# Patient Record
Sex: MALE | Race: WHITE | Employment: UNEMPLOYED | URBAN - NONMETROPOLITAN AREA
[De-identification: names, ages, dates, MRNs, and addresses within clinical notes are randomized per-mention and may not be internally consistent; named-entity substitution may affect disease eponyms.]

---

## 2019-04-02 ENCOUNTER — TELEPHONE (OUTPATIENT)
Dept: INTERNAL MEDICINE CLINIC | Age: 39
End: 2019-04-02

## 2019-04-02 NOTE — TELEPHONE ENCOUNTER
Returning phone call back to wife. Wife stated patient has an appt with the diabetic nurse educator on Thursday, April 4th of this week at the [facility] and needs to change the appt. I explained that the DB center does not have an appt scheduled and we do not have a referral for him. Wife stated she must of called the wrong office.

## 2019-09-04 ENCOUNTER — TELEPHONE (OUTPATIENT)
Dept: FAMILY MEDICINE CLINIC | Age: 39
End: 2019-09-04

## 2019-09-04 NOTE — TELEPHONE ENCOUNTER
Left detailed message on mach    1. Appt time and date. (give directions)     2. Arrive 15 min before appt. 3. Please bring all medications to appt. 4. Bring immunization record.   (if no record, which immunizations have you had and where?)      Future Appointments   Date Time Provider Rachel Champagne   9/24/2019 12:40 PM Hans Sandifer, [address] [place] [address]

## 2020-02-24 ENCOUNTER — OFFICE VISIT (OUTPATIENT)
Dept: FAMILY MEDICINE CLINIC | Age: 40
End: 2020-02-24
Payer: COMMERCIAL

## 2020-02-24 VITALS
TEMPERATURE: 98.8 F | BODY MASS INDEX: 60.16 KG/M2 | RESPIRATION RATE: 20 BRPM | OXYGEN SATURATION: 99 % | SYSTOLIC BLOOD PRESSURE: 138 MMHG | DIASTOLIC BLOOD PRESSURE: 88 MMHG | HEART RATE: 90 BPM | HEIGHT: 73 IN

## 2020-02-24 PROBLEM — E03.9 HYPOTHYROIDISM: Status: ACTIVE | Noted: 2020-02-24

## 2020-02-24 PROBLEM — E11.9 DIABETES MELLITUS (HCC): Status: ACTIVE | Noted: 2020-02-24

## 2020-02-24 LAB
ALBUMIN SERPL-MCNC: 3.6 G/DL (ref 3.5–5.1)
ALP BLD-CCNC: 105 U/L (ref 38–126)
ALT SERPL-CCNC: 61 U/L (ref 11–66)
ANION GAP SERPL CALCULATED.3IONS-SCNC: 12 MEQ/L (ref 8–16)
AST SERPL-CCNC: 64 U/L (ref 5–40)
BASOPHILS # BLD: 0.7 %
BASOPHILS ABSOLUTE: 0.1 THOU/MM3 (ref 0–0.1)
BILIRUB SERPL-MCNC: 0.2 MG/DL (ref 0.3–1.2)
BILIRUBIN DIRECT: < 0.2 MG/DL (ref 0–0.3)
BUN BLDV-MCNC: 7 MG/DL (ref 7–22)
CALCIUM SERPL-MCNC: 8.9 MG/DL (ref 8.5–10.5)
CHLORIDE BLD-SCNC: 94 MEQ/L (ref 98–111)
CHOLESTEROL, TOTAL: 210 MG/DL (ref 100–199)
CO2: 24 MEQ/L (ref 23–33)
CREAT SERPL-MCNC: 0.9 MG/DL (ref 0.4–1.2)
EOSINOPHIL # BLD: 2.4 %
EOSINOPHILS ABSOLUTE: 0.3 THOU/MM3 (ref 0–0.4)
ERYTHROCYTE [DISTWIDTH] IN BLOOD BY AUTOMATED COUNT: 14.6 % (ref 11.5–14.5)
ERYTHROCYTE [DISTWIDTH] IN BLOOD BY AUTOMATED COUNT: 48.9 FL (ref 35–45)
GFR SERPL CREATININE-BSD FRML MDRD: > 90 ML/MIN/1.73M2
GLUCOSE BLD-MCNC: 447 MG/DL (ref 70–108)
HCT VFR BLD CALC: 43.9 % (ref 42–52)
HDLC SERPL-MCNC: 32 MG/DL
HEMOGLOBIN: 14 GM/DL (ref 14–18)
IMMATURE GRANS (ABS): 0.12 THOU/MM3 (ref 0–0.07)
IMMATURE GRANULOCYTES: 1 %
LDL CHOLESTEROL CALCULATED: 142 MG/DL
LYMPHOCYTES # BLD: 22.1 %
LYMPHOCYTES ABSOLUTE: 2.7 THOU/MM3 (ref 1–4.8)
MCH RBC QN AUTO: 29.8 PG (ref 26–33)
MCHC RBC AUTO-ENTMCNC: 31.9 GM/DL (ref 32.2–35.5)
MCV RBC AUTO: 93.4 FL (ref 80–94)
MONOCYTES # BLD: 5.5 %
MONOCYTES ABSOLUTE: 0.7 THOU/MM3 (ref 0.4–1.3)
NUCLEATED RED BLOOD CELLS: 0 /100 WBC
PLATELET # BLD: 265 THOU/MM3 (ref 130–400)
PMV BLD AUTO: 11.6 FL (ref 9.4–12.4)
POTASSIUM SERPL-SCNC: 4.5 MEQ/L (ref 3.5–5.2)
RBC # BLD: 4.7 MILL/MM3 (ref 4.7–6.1)
SEG NEUTROPHILS: 68.3 %
SEGMENTED NEUTROPHILS ABSOLUTE COUNT: 8.3 THOU/MM3 (ref 1.8–7.7)
SODIUM BLD-SCNC: 130 MEQ/L (ref 135–145)
TOTAL PROTEIN: 7.5 G/DL (ref 6.1–8)
TRIGL SERPL-MCNC: 181 MG/DL (ref 0–199)
TSH SERPL DL<=0.05 MIU/L-ACNC: 4.16 UIU/ML (ref 0.4–4.2)
WBC # BLD: 12.1 THOU/MM3 (ref 4.8–10.8)

## 2020-02-24 PROCEDURE — 36415 COLL VENOUS BLD VENIPUNCTURE: CPT | Performed by: NURSE PRACTITIONER

## 2020-02-24 PROCEDURE — G0444 DEPRESSION SCREEN ANNUAL: HCPCS | Performed by: NURSE PRACTITIONER

## 2020-02-24 PROCEDURE — 99385 PREV VISIT NEW AGE 18-39: CPT | Performed by: NURSE PRACTITIONER

## 2020-02-24 RX ORDER — ROPINIROLE 3 MG/1
3 TABLET, FILM COATED ORAL NIGHTLY
Qty: 30 TABLET | Refills: 3 | Status: SHIPPED | OUTPATIENT
Start: 2020-02-24 | End: 2020-11-18 | Stop reason: SDUPTHER

## 2020-02-24 RX ORDER — LEVOTHYROXINE SODIUM 0.05 MG/1
50 TABLET ORAL DAILY
Qty: 30 TABLET | Refills: 5 | Status: SHIPPED | OUTPATIENT
Start: 2020-02-24 | End: 2020-11-18 | Stop reason: SDUPTHER

## 2020-02-24 RX ORDER — LEVOTHYROXINE SODIUM 0.05 MG/1
50 TABLET ORAL DAILY
Qty: 30 TABLET | Refills: 5 | Status: SHIPPED | OUTPATIENT
Start: 2020-02-24 | End: 2020-02-24 | Stop reason: SDUPTHER

## 2020-02-24 RX ORDER — ROPINIROLE 3 MG/1
3 TABLET, FILM COATED ORAL NIGHTLY
Qty: 30 TABLET | Refills: 3 | Status: SHIPPED | OUTPATIENT
Start: 2020-02-24 | End: 2020-02-24 | Stop reason: SDUPTHER

## 2020-02-24 ASSESSMENT — PATIENT HEALTH QUESTIONNAIRE - PHQ9
3. TROUBLE FALLING OR STAYING ASLEEP: 3
SUM OF ALL RESPONSES TO PHQ QUESTIONS 1-9: 23
1. LITTLE INTEREST OR PLEASURE IN DOING THINGS: 3
SUM OF ALL RESPONSES TO PHQ QUESTIONS 1-9: 23
5. POOR APPETITE OR OVEREATING: 3
2. FEELING DOWN, DEPRESSED OR HOPELESS: 3
SUM OF ALL RESPONSES TO PHQ9 QUESTIONS 1 & 2: 6
6. FEELING BAD ABOUT YOURSELF - OR THAT YOU ARE A FAILURE OR HAVE LET YOURSELF OR YOUR FAMILY DOWN: 3
4. FEELING TIRED OR HAVING LITTLE ENERGY: 3
9. THOUGHTS THAT YOU WOULD BE BETTER OFF DEAD, OR OF HURTING YOURSELF: 1
10. IF YOU CHECKED OFF ANY PROBLEMS, HOW DIFFICULT HAVE THESE PROBLEMS MADE IT FOR YOU TO DO YOUR WORK, TAKE CARE OF THINGS AT HOME, OR GET ALONG WITH OTHER PEOPLE: 3
8. MOVING OR SPEAKING SO SLOWLY THAT OTHER PEOPLE COULD HAVE NOTICED. OR THE OPPOSITE, BEING SO FIGETY OR RESTLESS THAT YOU HAVE BEEN MOVING AROUND A LOT MORE THAN USUAL: 3
7. TROUBLE CONCENTRATING ON THINGS, SUCH AS READING THE NEWSPAPER OR WATCHING TELEVISION: 1

## 2020-02-24 ASSESSMENT — COLUMBIA-SUICIDE SEVERITY RATING SCALE - C-SSRS
6. HAVE YOU EVER DONE ANYTHING, STARTED TO DO ANYTHING, OR PREPARED TO DO ANYTHING TO END YOUR LIFE?: YES
4. HAVE YOU HAD THESE THOUGHTS AND HAD SOME INTENTION OF ACTING ON THEM?: YES
2. HAVE YOU ACTUALLY HAD ANY THOUGHTS OF KILLING YOURSELF?: YES
1. WITHIN THE PAST MONTH, HAVE YOU WISHED YOU WERE DEAD OR WISHED YOU COULD GO TO SLEEP AND NOT WAKE UP?: YES
3. HAVE YOU BEEN THINKING ABOUT HOW YOU MIGHT KILL YOURSELF?: YES
5. HAVE YOU STARTED TO WORK OUT OR WORKED OUT THE DETAILS OF HOW TO KILL YOURSELF? DO YOU INTEND TO CARRY OUT THIS PLAN?: YES
7. DID THIS OCCUR IN THE LAST THREE MONTHS: WITHIN THE LAST THREE MONTHS?

## 2020-02-24 ASSESSMENT — ENCOUNTER SYMPTOMS
ALLERGIC/IMMUNOLOGIC NEGATIVE: 1
EYE DISCHARGE: 0
EYE REDNESS: 0
WHEEZING: 0
EYE PAIN: 0
SHORTNESS OF BREATH: 0
RHINORRHEA: 0
SORE THROAT: 0
NAUSEA: 0
VOMITING: 0
TROUBLE SWALLOWING: 0
DIARRHEA: 0
COUGH: 0
CONSTIPATION: 0
ABDOMINAL PAIN: 0

## 2020-02-24 NOTE — PROGRESS NOTES
[address] [address]  Dept: [phone]  Dept Fax: [phone]  Loc: [facility] Therese Rascon. is a 44 y.o.male who has not seen a PCP in 8 months and quit taking all of his medication and frustration. He recently found this office through his wife's medical policy. Patient has numerous and significant medical comorbidities that can be noted in the visit diagnosis section of the chart. Of concern is the patient's longstanding chronic lumbar pain and treatment for anxiety over the long-term with benzodiazepines. Patient states he is in a particularly uncomfortable situation due to not having any of these medications for a few months and he seems anxious to get reinstated with these. Wife states patient has resumed somewhat heavy alcohol consumption to cope with this.       Patient Active Problem List   Diagnosis    Snoring    Obstructive sleep apnea    Morning headache    Insomnia    Sleep talking    Daytime somnolence    Morbid obesity with BMI of 60.0-69.9, adult (HCC)    Sleep difficulties    Sleep disorder breathing    Bruxism    Restless sleeper    Vivid dream    Kidney stones    Obesity    Migraine    Diabetes mellitus (HCC)    Hypothyroidism       Current Outpatient Medications   Medication Sig Dispense Refill    rOPINIRole (REQUIP) 3 MG tablet Take 1 tablet by mouth nightly 30 tablet 3    levothyroxine (SYNTHROID) 50 MCG tablet Take 1 tablet by mouth daily 30 tablet 5    metFORMIN (GLUCOPHAGE) 500 MG tablet Take 1 tablet by mouth 2 times daily 180 tablet 1    Dulaglutide (TRULICITY) 1.5 QG/4.7GP SOPN Inject 1.5 mg into the skin once a week 4 pen 2    amLODIPine (NORVASC) 5 MG tablet Take 1 tablet by mouth daily      ARIPiprazole (ABILIFY) 10 MG tablet Take 1 tablet by mouth daily      divalproex (DEPAKOTE) 250 MG DR tablet Take 250 mg by mouth 4 times daily  TRULICITY 1.5 IR/4.0DF SOPN Take 1.5 mg by mouth once a week       DULoxetine (CYMBALTA) 30 MG extended release capsule Take 1 capsule by mouth daily      DULoxetine (CYMBALTA) 60 MG extended release capsule Take 1 capsule by mouth daily      hydrALAZINE (APRESOLINE) 100 MG tablet Take 1 tablet by mouth 3 times daily      LEVEMIR FLEXTOUCH 100 UNIT/ML injection pen Inject 35 Units into the skin 2 times daily       lisinopril (PRINIVIL;ZESTRIL) 10 MG tablet Take 1 tablet by mouth daily      metoprolol tartrate (LOPRESSOR) 50 MG tablet Take 1 tablet by mouth 2 times daily      omeprazole (PRILOSEC) 40 MG delayed release capsule Take 1 capsule by mouth daily      pregabalin (LYRICA) 200 MG capsule Take 1 capsule by mouth 2 times daily.  insulin regular (NOVOLIN R) 100 UNIT/ML injection Inject 13 Units into the skin 3 times daily (with meals) Plus sliding scale      promethazine (PHENERGAN) 25 MG tablet Take 1 tablet by mouth every 6 hours as needed for Nausea. (Patient not taking: Reported on 2/24/2020) 20 tablet 0    famotidine (PEPCID) 20 MG tablet Take 1 tablet by mouth 2 times daily for 7 days. 14 tablet 0     No current facility-administered medications for this visit. Review of Systems   Constitutional: Negative for activity change, fatigue and fever. HENT: Negative for congestion, ear pain, rhinorrhea, sore throat and trouble swallowing. Eyes: Negative for pain, discharge and redness. Respiratory: Negative for cough, shortness of breath and wheezing. Cardiovascular: Negative. Gastrointestinal: Negative for abdominal pain, constipation, diarrhea, nausea and vomiting. Endocrine: Negative. Genitourinary: Negative for dysuria, frequency and urgency. Musculoskeletal: Positive for back pain. Negative for arthralgias and myalgias. Skin: Negative for rash. Allergic/Immunologic: Negative. Neurological: Negative for dizziness, tremors, weakness and headaches. Hematological: Negative. Psychiatric/Behavioral: Positive for sleep disturbance. Negative for dysphoric mood. The patient is nervous/anxious. OBJECTIVE     /88   Pulse 90   Temp 98.8 °F (37.1 °C) (Oral)   Resp 20   Ht 6' 1\" (1.854 m)   SpO2 99%   BMI 60.16 kg/m²     Wt Readings from Last 3 Encounters:   14 (!) 456 lb 12.8 oz (207.2 kg)   14 (!) 447 lb (202.8 kg)   14 (!) 446 lb (202.3 kg)     Body mass index is 60.16 kg/m². Physical Exam  Vitals signs reviewed. Constitutional:       General: He is not in acute distress. Appearance: Normal appearance. He is well-developed. He is not toxic-appearing or diaphoretic. HENT:      Head: Normocephalic. No right periorbital erythema or left periorbital erythema. Jaw: No trismus. Right Ear: Hearing, tympanic membrane, ear canal and external ear normal.      Left Ear: Hearing, tympanic membrane, ear canal and external ear normal.      Nose: Nose normal. No mucosal edema or rhinorrhea. Mouth/Throat:      Mouth: Mucous membranes are moist.      Dentition: Normal dentition. Pharynx: Uvula midline. No posterior oropharyngeal erythema. Tonsils: No tonsillar exudate. Swellin on the right. 0 on the left. Eyes:      General: Lids are normal.         Right eye: No discharge. Left eye: No discharge. Conjunctiva/sclera: Conjunctivae normal.      Right eye: Right conjunctiva is not injected. No chemosis. Left eye: No chemosis. Pupils: Pupils are equal, round, and reactive to light. Neck:      Musculoskeletal: Full passive range of motion without pain and normal range of motion. Vascular: No JVD. Trachea: Trachea normal.   Cardiovascular:      Rate and Rhythm: Normal rate and regular rhythm. Heart sounds: Normal heart sounds. No murmur. Pulmonary:      Effort: Pulmonary effort is normal. No respiratory distress. Breath sounds: Normal breath sounds.  No stridor. No wheezing. Abdominal:      General: Bowel sounds are normal.      Palpations: Abdomen is soft. Tenderness: There is no abdominal tenderness. Musculoskeletal: Normal range of motion. General: No tenderness or signs of injury. Lymphadenopathy:      Cervical: No cervical adenopathy. Skin:     General: Skin is warm and dry. Capillary Refill: Capillary refill takes less than 2 seconds. Findings: No rash. Neurological:      Mental Status: He is alert and oriented to person, place, and time. GCS: GCS eye subscore is 4. GCS verbal subscore is 5. GCS motor subscore is 6. Cranial Nerves: No cranial nerve deficit. Coordination: Coordination normal.      Gait: Gait normal.   Psychiatric:         Mood and Affect: Mood is not anxious or depressed. Speech: Speech normal.         Behavior: Behavior normal. Behavior is not withdrawn or hyperactive. Behavior is cooperative. Thought Content: Thought content normal.         Judgment: Judgment normal.         No results found for: LABA1C    Lab Results   Component Value Date    CHOL 210 (H) 02/24/2020    TRIG 181 02/24/2020    HDL 32 02/24/2020    LDLCALC 142 02/24/2020       The ASCVD Risk score ([address] et al., 2013) failed to calculate for the following reasons:     The 2013 ASCVD risk score is only valid for ages 36 to 78    Lab Results   Component Value Date     (L) 02/24/2020    K 4.5 02/24/2020    CL 94 (L) 02/24/2020    CO2 24 02/24/2020    BUN 7 02/24/2020    CREATININE 0.9 02/24/2020    GLUCOSE 447 (H) 02/24/2020    CALCIUM 8.9 02/24/2020    PROT 7.5 02/24/2020    LABALBU 3.6 02/24/2020    BILITOT 0.2 (L) 02/24/2020    ALKPHOS 105 02/24/2020    AST 64 (H) 02/24/2020    ALT 61 02/24/2020    LABGLOM >90 02/24/2020       No results found for: LABMICR, [id number]    Lab Results   Component Value Date    TSH 4.160 02/24/2020       Lab Results   Component Value Date    WBC 12.1 (H) 02/24/2020    HGB

## 2020-02-25 ENCOUNTER — TELEPHONE (OUTPATIENT)
Dept: FAMILY MEDICINE CLINIC | Age: 40
End: 2020-02-25

## 2020-02-25 ASSESSMENT — ENCOUNTER SYMPTOMS: BACK PAIN: 1

## 2020-02-25 NOTE — TELEPHONE ENCOUNTER
Called and spoke with Mc, on HIPAA, let her know results. I instructed her to get medications started ASAP and if there was any issue with this, to call us right away so that we could help to assist him with this or he may end up in the emergency room. She verbalized understanding.

## 2020-02-25 NOTE — TELEPHONE ENCOUNTER
----- Message from KELLY Blevins CNP sent at 2/25/2020  8:38 AM EST -----  Please notify patient his blood sugar yesterday was 447. This is to reiterate he needs to get his diabetic medication started as soon as possible or he may end up in the emergency department soon.

## 2020-03-03 ENCOUNTER — OFFICE VISIT (OUTPATIENT)
Dept: FAMILY MEDICINE CLINIC | Age: 40
End: 2020-03-03
Payer: COMMERCIAL

## 2020-03-03 VITALS
DIASTOLIC BLOOD PRESSURE: 90 MMHG | SYSTOLIC BLOOD PRESSURE: 160 MMHG | RESPIRATION RATE: 20 BRPM | HEIGHT: 73 IN | TEMPERATURE: 98 F | BODY MASS INDEX: 60.16 KG/M2 | HEART RATE: 108 BPM

## 2020-03-03 PROCEDURE — 99215 OFFICE O/P EST HI 40 MIN: CPT | Performed by: NURSE PRACTITIONER

## 2020-03-03 RX ORDER — DULOXETIN HYDROCHLORIDE 60 MG/1
60 CAPSULE, DELAYED RELEASE ORAL DAILY
Qty: 30 CAPSULE | Refills: 0 | Status: SHIPPED | OUTPATIENT
Start: 2020-03-03 | End: 2020-04-21 | Stop reason: SDUPTHER

## 2020-03-03 RX ORDER — HYDROCODONE BITARTRATE AND ACETAMINOPHEN 7.5; 325 MG/1; MG/1
1 TABLET ORAL EVERY 8 HOURS PRN
Qty: 21 TABLET | Refills: 0 | Status: SHIPPED | OUTPATIENT
Start: 2020-03-03 | End: 2020-03-09 | Stop reason: SDUPTHER

## 2020-03-03 RX ORDER — INSULIN DETEMIR 100 [IU]/ML
35 INJECTION, SOLUTION SUBCUTANEOUS 2 TIMES DAILY
Qty: 5 PEN | Refills: 2 | Status: SHIPPED | OUTPATIENT
Start: 2020-03-03 | End: 2021-02-05

## 2020-03-03 RX ORDER — ARIPIPRAZOLE 10 MG/1
10 TABLET ORAL DAILY
Qty: 30 TABLET | Refills: 3 | Status: SHIPPED | OUTPATIENT
Start: 2020-03-03 | End: 2020-08-25 | Stop reason: SDUPTHER

## 2020-03-03 ASSESSMENT — ENCOUNTER SYMPTOMS
WHEEZING: 0
EYE PAIN: 0
SORE THROAT: 0
VOMITING: 0
RHINORRHEA: 0
CONSTIPATION: 0
ALLERGIC/IMMUNOLOGIC NEGATIVE: 1
DIARRHEA: 0
BACK PAIN: 1
COUGH: 0
EYE REDNESS: 0
TROUBLE SWALLOWING: 0
NAUSEA: 0
ABDOMINAL PAIN: 0
SHORTNESS OF BREATH: 0
EYE DISCHARGE: 0

## 2020-03-03 NOTE — PROGRESS NOTES
[address] [address]  Dept: [phone]  Dept Fax: [phone]  Loc: [address] [place] [address] Zaira Graves is a 44 y.o.male here for follow-up after being established last week as a new patient with multiple serious comorbidities and obesity. Patient is complaining of acute right foot/ankle/knee pain that started a couple days ago making it painful for him to walk on. Pain is sharp and originates he thinks in his ankle and goes up into his knee. He also continues to be in significant pain from his chronic lumbar pain, which we discussed on his visit last week and did not treat at that time. Patient was started back on his diabetic medications after a 8-month hiatus and a blood sugar of around 440 here last week. He states he has been taking the metformin and Trulicity but needs updated scripts on the NovoLog and on Levemir. He is not been checking his blood sugars however.       Patient Active Problem List   Diagnosis    Snoring    Obstructive sleep apnea    Morning headache    Insomnia    Sleep talking    Daytime somnolence    Morbid obesity with BMI of 60.0-69.9, adult (HCC)    Sleep difficulties    Sleep disorder breathing    Bruxism    Restless sleeper    Vivid dream    Kidney stones    Obesity    Migraine    Diabetes mellitus (HCC)    Hypothyroidism       Current Outpatient Medications   Medication Sig Dispense Refill    DULoxetine (CYMBALTA) 60 MG extended release capsule Take 1 capsule by mouth daily 30 capsule 0    ARIPiprazole (ABILIFY) 10 MG tablet Take 1 tablet by mouth daily 30 tablet 3    LEVEMIR FLEXTOUCH 100 UNIT/ML injection pen Inject 35 Units into the skin 2 times daily 5 pen 2    insulin aspart (NOVOLOG) 100 UNIT/ML injection vial Inject 13 Units into the skin 3 times daily (before meals) 1 vial 2    HYDROcodone-acetaminophen (NORCO) 7.5-325 MG per tablet Take 1 Judgment: Judgment normal.         No results found for: LABA1C    Lab Results   Component Value Date    CHOL 210 (H) 02/24/2020    TRIG 181 02/24/2020    HDL 32 02/24/2020    LDLCALC 142 02/24/2020       The ASCVD Risk score (Shelley Richard, et al., 2013) failed to calculate for the following reasons: The 2013 ASCVD risk score is only valid for ages 36 to 78    Lab Results   Component Value Date     (L) 02/24/2020    K 4.5 02/24/2020    CL 94 (L) 02/24/2020    CO2 24 02/24/2020    BUN 7 02/24/2020    CREATININE 0.9 02/24/2020    GLUCOSE 447 (H) 02/24/2020    CALCIUM 8.9 02/24/2020    PROT 7.5 02/24/2020    LABALBU 3.6 02/24/2020    BILITOT 0.2 (L) 02/24/2020    ALKPHOS 105 02/24/2020    AST 64 (H) 02/24/2020    ALT 61 02/24/2020    LABGLOM >90 02/24/2020       No results found for: LABMICR, [id number]    Lab Results   Component Value Date    TSH 4.160 02/24/2020       Lab Results   Component Value Date    WBC 12.1 (H) 02/24/2020    HGB 14.0 02/24/2020    HCT 43.9 02/24/2020    MCV 93.4 02/24/2020     02/24/2020       No results found for: PSA, PSADIA      There is no immunization history on file for this patient.     Health Maintenance   Topic Date Due    Pneumococcal 0-64 years Vaccine (1 of 1 - PPSV23) 08/21/1986    Diabetic foot exam  08/21/1990    A1C test (Diabetic or Prediabetic)  08/21/1990    Diabetic retinal exam  08/21/1990    DTaP/Tdap/Td vaccine (1 - Tdap) 08/21/1991    HIV screen  08/21/1995    Diabetic microalbuminuria test  08/21/1998    Hepatitis B vaccine (1 of 3 - Risk 3-dose series) 08/21/1999    Flu vaccine (1) 02/24/2021 (Originally 9/1/2019)    Lipid screen  02/24/2021    TSH testing  02/24/2021    Potassium monitoring  02/24/2021    Creatinine monitoring  02/24/2021    Shingles Vaccine (1 of 2) 08/21/2030    Hepatitis A vaccine  Aged Out    Hib vaccine  Aged Out    Meningococcal (ACWY) vaccine  Aged Out    Varicella vaccine  Discontinued       Future Appointments Date Time Provider Rachel Champagne   3/31/2020 10:00 AM KELLY Del Valle CNP [facility]       ASSESSMENT      I spent approximately 60 minutes with the patient and his wife going over his diabetic medications and getting those prescriptions updated. I also agreed to starting him back on Cymbalta and Abilify, based on his recent [employer] gene testing, to treat his significant depression. I also discussed in depth and with firm application his need for pain management, but that he also needs to comprehensively treat his medical problems and significant obesity and not rely on pain management to mask his other problems. We also discussed the option of sending him to diabetic management and he did not prefer to do that at this time. We also discussed the patient's hypertension but he is only been on the medication a few days. Diagnosis Orders   1. Mild episode of recurrent major depressive disorder (HCC)  DULoxetine (CYMBALTA) 60 MG extended release capsule    ARIPiprazole (ABILIFY) 10 MG tablet   2. Type 2 diabetes mellitus with other specified complication, without long-term current use of insulin (HCC)  LEVEMIR FLEXTOUCH 100 UNIT/ML injection pen    insulin aspart (NOVOLOG) 100 UNIT/ML injection vial   3. Chronic radicular lumbar pain  HYDROcodone-acetaminophen (NORCO) 7.5-325 MG per tablet       PLAN      1. Prescriptions for Abilify and Cymbalta. 2.  Prescription for Norco for 1 week. 3.  Prescriptions for NovoLog and Levemir at previously prescribed levels. Patient will start taking his blood sugar at least once a day in the morning. 4.  We will discuss a number of other health maintenance issues when the patient comes back in 1 month for follow-up.       Electronically signed by KELLY Del Valle CNP on 3/3/2020 at 12:52 PM

## 2020-03-09 RX ORDER — HYDROCODONE BITARTRATE AND ACETAMINOPHEN 7.5; 325 MG/1; MG/1
1 TABLET ORAL EVERY 8 HOURS PRN
Qty: 21 TABLET | Refills: 0 | Status: SHIPPED | OUTPATIENT
Start: 2020-03-09 | End: 2020-03-16 | Stop reason: SDUPTHER

## 2020-03-16 RX ORDER — HYDROCODONE BITARTRATE AND ACETAMINOPHEN 7.5; 325 MG/1; MG/1
1 TABLET ORAL EVERY 8 HOURS PRN
Qty: 90 TABLET | Refills: 0 | Status: SHIPPED | OUTPATIENT
Start: 2020-03-16 | End: 2020-04-13 | Stop reason: SDUPTHER

## 2020-03-16 NOTE — TELEPHONE ENCOUNTER
Jose E Wyatt. called requesting a refill on the following medications:  Requested Prescriptions     Pending Prescriptions Disp Refills    HYDROcodone-acetaminophen (NORCO) 7.5-325 MG per tablet 21 tablet 0     Sig: Take 1 tablet by mouth every 8 hours as needed for Pain for up to 90 doses. Intended supply: 30 days     [facility]      Date of last visit: 03/03/20  Date of next visit (if applicable): 9/35/8830

## 2020-04-08 ENCOUNTER — E-VISIT (OUTPATIENT)
Dept: FAMILY MEDICINE CLINIC | Age: 40
End: 2020-04-08
Payer: COMMERCIAL

## 2020-04-08 PROCEDURE — 98970 NQHP OL DIG ASSMT&MGMT 5-10: CPT | Performed by: NURSE PRACTITIONER

## 2020-04-09 RX ORDER — DOXYCYCLINE HYCLATE 100 MG
100 TABLET ORAL 2 TIMES DAILY
Qty: 20 TABLET | Refills: 0 | Status: SHIPPED | OUTPATIENT
Start: 2020-04-09 | End: 2020-04-19

## 2020-04-22 RX ORDER — DULOXETIN HYDROCHLORIDE 60 MG/1
60 CAPSULE, DELAYED RELEASE ORAL DAILY
Qty: 30 CAPSULE | Refills: 0 | Status: SHIPPED | OUTPATIENT
Start: 2020-04-22 | End: 2020-05-19 | Stop reason: SDUPTHER

## 2020-04-24 ENCOUNTER — PATIENT MESSAGE (OUTPATIENT)
Dept: FAMILY MEDICINE CLINIC | Age: 40
End: 2020-04-24

## 2020-04-25 NOTE — TELEPHONE ENCOUNTER
From: Ana Maria Whiteside. To: Maira FisherKELLY - CNP  Sent: 4/24/2020 8:03 PM EDT  Subject: Non-Urgent Medical Question    I am having severe pain in my right shoulder. My medication doesn't seem to be helping the pain. I stood up earlier this week and felt the shoulder pop. Then as I went to take a step I tripped over something and fell on to my couch. I felt the shoulder pop back in when I fell. I am concerned because I am still in such pain and I am struggling to be able to even get to sleep without the pain waking me up. Thank you for your help.   Farooq Nguyen

## 2020-04-27 RX ORDER — KETOROLAC TROMETHAMINE 10 MG/1
10 TABLET, FILM COATED ORAL EVERY 6 HOURS PRN
Qty: 20 TABLET | Refills: 0 | Status: SHIPPED | OUTPATIENT
Start: 2020-04-27 | End: 2020-05-18 | Stop reason: ALTCHOICE

## 2020-05-11 RX ORDER — HYDROCODONE BITARTRATE AND ACETAMINOPHEN 7.5; 325 MG/1; MG/1
1 TABLET ORAL EVERY 8 HOURS PRN
Qty: 90 TABLET | Refills: 0 | Status: SHIPPED | OUTPATIENT
Start: 2020-05-11 | End: 2020-06-10 | Stop reason: SDUPTHER

## 2020-05-12 RX ORDER — GLUCOSAMINE HCL/CHONDROITIN SU 500-400 MG
CAPSULE ORAL
Qty: 200 STRIP | Refills: 0 | Status: SHIPPED | OUTPATIENT
Start: 2020-05-12 | End: 2020-05-18 | Stop reason: SDUPTHER

## 2020-05-18 ENCOUNTER — TELEMEDICINE (OUTPATIENT)
Dept: FAMILY MEDICINE CLINIC | Age: 40
End: 2020-05-18
Payer: COMMERCIAL

## 2020-05-18 PROBLEM — E66.01 CLASS 3 SEVERE OBESITY WITH SERIOUS COMORBIDITY AND BODY MASS INDEX (BMI) OF 60.0 TO 69.9 IN ADULT (HCC): Status: ACTIVE | Noted: 2020-05-18

## 2020-05-18 PROCEDURE — 99214 OFFICE O/P EST MOD 30 MIN: CPT | Performed by: NURSE PRACTITIONER

## 2020-05-18 RX ORDER — DIVALPROEX SODIUM 250 MG/1
500 TABLET, DELAYED RELEASE ORAL 2 TIMES DAILY
Qty: 120 TABLET | Refills: 0 | Status: SHIPPED | OUTPATIENT
Start: 2020-05-18 | End: 2020-08-06 | Stop reason: SDUPTHER

## 2020-05-18 RX ORDER — GLUCOSAMINE HCL/CHONDROITIN SU 500-400 MG
CAPSULE ORAL
Qty: 200 STRIP | Refills: 0 | Status: SHIPPED | OUTPATIENT
Start: 2020-05-18

## 2020-05-18 ASSESSMENT — ENCOUNTER SYMPTOMS
NAUSEA: 0
ABDOMINAL PAIN: 0
EYE DISCHARGE: 0
BACK PAIN: 0
DIARRHEA: 0
EYE PAIN: 0
VOMITING: 0
COUGH: 0
ALLERGIC/IMMUNOLOGIC NEGATIVE: 1
SHORTNESS OF BREATH: 0
RHINORRHEA: 0
EYE REDNESS: 0
SORE THROAT: 0
WHEEZING: 0
TROUBLE SWALLOWING: 0
CONSTIPATION: 0

## 2020-05-18 NOTE — PROGRESS NOTES
[address] [address]  Dept: [phone]  Dept Fax: [phone]  Loc: [facility] Romana Andes. is a 44 y.o.male for VV for 3-4 months of left shoulder pain of unknown etiology. He sleeps on his left side which exacerbates the pain. He is right handed. Has not completed his x-ray of the left shoulder ordered 3 months ago, due to [place] pandemic. He still has use but has some limited mobility. He states he has not been able to get a hold of his neurologist for a Depakote refill. Blood sugars have ranged between 250 and 500. He states the NovoLog is giving him severe diaphoresis. States he is not taking it as often as he should but he is taking his other diabetic meds regularly. Patient requested assistance getting marijuana card for pain control. Patient Active Problem List   Diagnosis    Snoring    Obstructive sleep apnea    Morning headache    Insomnia    Sleep talking    Daytime somnolence    Morbid obesity with BMI of 60.0-69.9, adult ([facility] [address]    Sleep difficulties    Sleep disorder breathing    Bruxism    Restless sleeper    Vivid dream    Migraine    Diabetes mellitus ([facility] [address]    Hypothyroidism    Class 3 severe obesity with serious comorbidity and body mass index (BMI) of 60.0 to 69.9 in adult [facility])       Current Outpatient Medications   Medication Sig Dispense Refill    divalproex (DEPAKOTE) 250 MG DR tablet Take 2 tablets by mouth 2 times daily 120 tablet 0    blood glucose monitor strips Test once a day & as needed for symptoms of irregular blood glucose. 200 strip 0    HYDROcodone-acetaminophen (NORCO) 7.5-325 MG per tablet Take 1 tablet by mouth every 8 hours as needed for Pain for up to 30 days.  Intended supply: 30 days 90 tablet 0    DULoxetine (CYMBALTA) 60 MG extended release capsule Take 1 capsule by mouth daily 30 capsule 0    ARIPiprazole (ABILIFY) 10 MG tablet Take 1 tablet by mouth daily 30 tablet 3    LEVEMIR FLEXTOUCH 100 UNIT/ML injection pen Inject 35 Units into the skin 2 times daily 5 pen 2    insulin aspart (NOVOLOG) 100 UNIT/ML injection vial Inject 13 Units into the skin 3 times daily (before meals) 1 vial 2    rOPINIRole (REQUIP) 3 MG tablet Take 1 tablet by mouth nightly 30 tablet 3    levothyroxine (SYNTHROID) 50 MCG tablet Take 1 tablet by mouth daily 30 tablet 5    metFORMIN (GLUCOPHAGE) 500 MG tablet Take 1 tablet by mouth 2 times daily 180 tablet 1    Dulaglutide (TRULICITY) 1.5 MZ/7.4BZ SOPN Inject 1.5 mg into the skin once a week 4 pen 2    TRULICITY 1.5 QS/2.8TV SOPN Take 1.5 mg by mouth once a week       hydrALAZINE (APRESOLINE) 100 MG tablet Take 1 tablet by mouth 3 times daily      lisinopril (PRINIVIL;ZESTRIL) 10 MG tablet Take 1 tablet by mouth daily      omeprazole (PRILOSEC) 40 MG delayed release capsule Take 1 capsule by mouth daily      insulin regular (NOVOLIN R) 100 UNIT/ML injection Inject 13 Units into the skin 3 times daily (with meals) Plus sliding scale      promethazine (PHENERGAN) 25 MG tablet Take 1 tablet by mouth every 6 hours as needed for Nausea. 20 tablet 0     No current facility-administered medications for this visit. Review of Systems   Constitutional: Positive for diaphoresis. Negative for activity change, fatigue and fever. HENT: Negative for congestion, ear pain, rhinorrhea, sore throat and trouble swallowing. Eyes: Negative for pain, discharge and redness. Respiratory: Negative for cough, shortness of breath and wheezing. Cardiovascular: Negative. Gastrointestinal: Negative for abdominal pain, constipation, diarrhea, nausea and vomiting. Endocrine: Negative. Genitourinary: Negative for dysuria, frequency and urgency. Musculoskeletal: Negative for arthralgias, back pain and myalgias. Skin: Negative for rash. Allergic/Immunologic: Negative.     Neurological: Positive for dizziness. Negative for tremors, weakness and headaches. Hematological: Negative. Psychiatric/Behavioral: Negative for dysphoric mood and sleep disturbance. The patient is not nervous/anxious. OBJECTIVE     There were no vitals taken for this visit. Wt Readings from Last 3 Encounters:   06/05/14 (!) 456 lb 12.8 oz (207.2 kg)   05/09/14 (!) 447 lb (202.8 kg)   02/13/14 (!) 446 lb (202.3 kg)     There is no height or weight on file to calculate BMI. Physical Exam  Constitutional:       General: He is not in acute distress. Appearance: He is ill-appearing. Pulmonary:      Effort: Respiratory distress present. Neurological:      Mental Status: He is alert. Psychiatric:         Mood and Affect: Mood normal.         Thought Content: Thought content normal.         No results found for: LABA1C    Lab Results   Component Value Date    CHOL 210 (H) 02/24/2020    TRIG 181 02/24/2020    HDL 32 02/24/2020    LDLCALC 142 02/24/2020       The ASCVD Risk score (Kiara Leach, et al., 2013) failed to calculate for the following reasons: The 2013 ASCVD risk score is only valid for ages 36 to 78    Lab Results   Component Value Date     (L) 02/24/2020    K 4.5 02/24/2020    CL 94 (L) 02/24/2020    CO2 24 02/24/2020    BUN 7 02/24/2020    CREATININE 0.9 02/24/2020    GLUCOSE 447 (H) 02/24/2020    CALCIUM 8.9 02/24/2020    PROT 7.5 02/24/2020    LABALBU 3.6 02/24/2020    BILITOT 0.2 (L) 02/24/2020    ALKPHOS 105 02/24/2020    AST 64 (H) 02/24/2020    ALT 61 02/24/2020    LABGLOM >90 02/24/2020       No results found for: LABMICR, [id number]    Lab Results   Component Value Date    TSH 4.160 02/24/2020       Lab Results   Component Value Date    WBC 12.1 (H) 02/24/2020    HGB 14.0 02/24/2020    HCT 43.9 02/24/2020    MCV 93.4 02/24/2020     02/24/2020       No results found for: PSA, PSADIA      There is no immunization history on file for this patient.     Health Maintenance Topic Date Due    Pneumococcal 0-64 years Vaccine (1 of 1 - PPSV23) 08/21/1986    Diabetic foot exam  08/21/1990    A1C test (Diabetic or Prediabetic)  08/21/1990    Diabetic retinal exam  08/21/1990    HIV screen  08/21/1995    Diabetic microalbuminuria test  08/21/1998    Hepatitis B vaccine (1 of 3 - Risk 3-dose series) 08/21/1999    DTaP/Tdap/Td vaccine (1 - Tdap) 08/21/1999    Flu vaccine (Season Ended) 02/24/2021 (Originally 9/1/2020)    Lipid screen  02/24/2021    TSH testing  02/24/2021    Potassium monitoring  02/24/2021    Creatinine monitoring  02/24/2021    Hepatitis A vaccine  Aged Out    Hib vaccine  Aged Out    Meningococcal (ACWY) vaccine  Aged Out    Varicella vaccine  Discontinued       No future appointments. ASSESSMENT       Diagnosis Orders   1. Type 2 diabetes mellitus with other specified complication, without long-term current use of insulin (HCC)  blood glucose monitor strips   2. Migraine without status migrainosus, not intractable, unspecified migraine type  divalproex (DEPAKOTE) 250 MG DR tablet   3. Chronic left shoulder pain     4. Class 3 severe obesity with serious comorbidity and body mass index (BMI) of 60.0 to 69.9 in adult, unspecified obesity type (HCC)         PLAN      Refill of Depakote for 30 days. Patient to follow-up with neurologist.  Patient will complete left shoulder x-ray. We will mail the order so he can do it in 1 part. Referral will be given for diabetic clinic for better management of his medications. Blood glucose strips ordered. Patient will follow-up in the office in 1 month. Electronically signed by KELLY Diehl CNP on 5/18/2020 at 1:34 PM    Althea Kong. is a 44 y.o. male being evaluated by a Virtual Visit (video visit) encounter to address concerns as mentioned above. A caregiver was present when appropriate.  Due to this being a TeleHealth encounter ([facility]), evaluation of the following organ systems was limited: Vitals/Constitutional/EENT/Resp/CV/GI//MS/Neuro/Skin/Heme-Lymph-Imm. Pursuant to the emergency declaration under the [address] [place] [address] and the [employer] Act, this Virtual Visit was conducted with patient's (and/or legal guardian's) consent, to reduce the patient's risk of exposure to COVID-19 and provide necessary medical care. The patient (and/or legal guardian) has also been advised to contact this office for worsening conditions or problems, and seek emergency medical treatment and/or call 911 if deemed necessary. Patient identification was verified at the start of the visit: Yes    Total time spent for this encounter: 30 min    Services were provided through a video synchronous discussion virtually to substitute for in-person clinic visit. Patient and provider were located at their individual homes. --KELLY Mcmillan - CNP on 5/18/2020 at 1:34 PM    An electronic signature was used to authenticate this note.

## 2020-05-19 RX ORDER — DULOXETIN HYDROCHLORIDE 60 MG/1
60 CAPSULE, DELAYED RELEASE ORAL DAILY
Qty: 30 CAPSULE | Refills: 3 | Status: SHIPPED | OUTPATIENT
Start: 2020-05-19 | End: 2020-11-18 | Stop reason: SDUPTHER

## 2020-06-10 RX ORDER — HYDROCODONE BITARTRATE AND ACETAMINOPHEN 7.5; 325 MG/1; MG/1
1 TABLET ORAL EVERY 8 HOURS PRN
Qty: 90 TABLET | Refills: 0 | Status: SHIPPED | OUTPATIENT
Start: 2020-06-10 | End: 2020-07-09 | Stop reason: SDUPTHER

## 2020-06-29 ENCOUNTER — HOSPITAL ENCOUNTER (OUTPATIENT)
Age: 40
Discharge: HOME | End: 2020-06-29
Payer: COMMERCIAL

## 2020-06-29 ENCOUNTER — HOSPITAL ENCOUNTER (OUTPATIENT)
Dept: GENERAL RADIOLOGY | Age: 40
Discharge: HOME | End: 2020-06-29
Payer: COMMERCIAL

## 2020-06-29 PROCEDURE — 73030 X-RAY EXAM OF SHOULDER: CPT

## 2020-07-09 RX ORDER — HYDROCODONE BITARTRATE AND ACETAMINOPHEN 7.5; 325 MG/1; MG/1
1 TABLET ORAL EVERY 8 HOURS PRN
Qty: 90 TABLET | Refills: 0 | Status: SHIPPED | OUTPATIENT
Start: 2020-07-09 | End: 2020-08-06 | Stop reason: SDUPTHER

## 2020-07-21 ENCOUNTER — OFFICE VISIT (OUTPATIENT)
Dept: FAMILY MEDICINE CLINIC | Age: 40
End: 2020-07-21
Payer: COMMERCIAL

## 2020-07-21 VITALS
DIASTOLIC BLOOD PRESSURE: 63 MMHG | HEIGHT: 70 IN | OXYGEN SATURATION: 95 % | TEMPERATURE: 97 F | HEART RATE: 116 BPM | RESPIRATION RATE: 24 BRPM | BODY MASS INDEX: 45.1 KG/M2 | SYSTOLIC BLOOD PRESSURE: 127 MMHG | WEIGHT: 315 LBS

## 2020-07-21 LAB — HBA1C MFR BLD: 12.9 %

## 2020-07-21 PROCEDURE — 99214 OFFICE O/P EST MOD 30 MIN: CPT | Performed by: NURSE PRACTITIONER

## 2020-07-21 PROCEDURE — 83036 HEMOGLOBIN GLYCOSYLATED A1C: CPT | Performed by: NURSE PRACTITIONER

## 2020-07-21 RX ORDER — CYCLOBENZAPRINE HCL 10 MG
10 TABLET ORAL 3 TIMES DAILY PRN
Qty: 15 TABLET | Refills: 0 | Status: SHIPPED | OUTPATIENT
Start: 2020-07-21 | End: 2020-07-26

## 2020-07-21 RX ORDER — PROMETHAZINE HYDROCHLORIDE 25 MG/1
25 TABLET ORAL EVERY 6 HOURS PRN
Qty: 20 TABLET | Refills: 0 | Status: SHIPPED | OUTPATIENT
Start: 2020-07-21

## 2020-07-21 ASSESSMENT — ENCOUNTER SYMPTOMS
COUGH: 0
ABDOMINAL PAIN: 0
SORE THROAT: 0
EYE REDNESS: 0
RHINORRHEA: 0
SHORTNESS OF BREATH: 0
WHEEZING: 0
EYE DISCHARGE: 0
NAUSEA: 0
VOMITING: 0
BACK PAIN: 0
DIARRHEA: 0
TROUBLE SWALLOWING: 0
CONSTIPATION: 0
ALLERGIC/IMMUNOLOGIC NEGATIVE: 1
EYE PAIN: 0

## 2020-07-21 NOTE — PROGRESS NOTES
[address] [address]  Dept: [phone]  Dept Fax: [phone]  Loc: 3 [facility] Gokul Ruelas is a 44 y.o.male here for 3-month follow-up with his main complaint being that of ongoing left chronic shoulder pain. Patient did have an x-ray finally completed which showed no acute findings. Patient states he believes he aggravates his shoulder by pushing himself up out of his chair, bearing most of his weight on that shoulder. He states it feels taut and cramping and he has trouble using it. He tries to exercise it so he does not sustain a frozen shoulder. Patient has been trying to keep inside during the [place] pandemic, has been using his Trulicity as prescribed. States overall he has not changed much since his last visit. Patient Active Problem List   Diagnosis    Snoring    Obstructive sleep apnea    Morning headache    Insomnia    Sleep talking    Daytime somnolence    Morbid obesity with BMI of 60.0-69.9, adult ([facility] [address]    Sleep difficulties    Sleep disorder breathing    Bruxism    Restless sleeper    Vivid dream    Migraine    Diabetes mellitus ([facility] [address]    Hypothyroidism    Class 3 severe obesity with serious comorbidity and body mass index (BMI) of 60.0 to 69.9 in adult [facility])       Current Outpatient Medications   Medication Sig Dispense Refill    promethazine (PHENERGAN) 25 MG tablet Take 1 tablet by mouth every 6 hours as needed for Nausea 20 tablet 0    cyclobenzaprine (FLEXERIL) 10 MG tablet Take 1 tablet by mouth 3 times daily as needed for Muscle spasms 15 tablet 0    HYDROcodone-acetaminophen (NORCO) 7.5-325 MG per tablet Take 1 tablet by mouth every 8 hours as needed for Pain for up to 30 days.  Intended supply: 30 days 90 tablet 0    DULoxetine (CYMBALTA) 60 MG extended release capsule Take 1 capsule by mouth daily 30 capsule 3    divalproex (DEPAKOTE) 250 MG DR tablet Take 2 tablets by mouth 2 times daily 120 tablet 0    blood glucose monitor strips Test once a day & as needed for symptoms of irregular blood glucose. 200 strip 0    ARIPiprazole (ABILIFY) 10 MG tablet Take 1 tablet by mouth daily 30 tablet 3    LEVEMIR FLEXTOUCH 100 UNIT/ML injection pen Inject 35 Units into the skin 2 times daily 5 pen 2    rOPINIRole (REQUIP) 3 MG tablet Take 1 tablet by mouth nightly 30 tablet 3    levothyroxine (SYNTHROID) 50 MCG tablet Take 1 tablet by mouth daily 30 tablet 5    metFORMIN (GLUCOPHAGE) 500 MG tablet Take 1 tablet by mouth 2 times daily (Patient taking differently: Take 500 mg by mouth 4 times daily ) 180 tablet 1    Dulaglutide (TRULICITY) 1.5 TI/0.4MD SOPN Inject 1.5 mg into the skin once a week 4 pen 2    hydrALAZINE (APRESOLINE) 100 MG tablet Take 1 tablet by mouth 3 times daily       No current facility-administered medications for this visit. Review of Systems   Constitutional: Negative for activity change, fatigue and fever. HENT: Negative for congestion, ear pain, rhinorrhea, sore throat and trouble swallowing. Eyes: Negative for pain, discharge and redness. Respiratory: Negative for cough, shortness of breath and wheezing. Cardiovascular: Negative. Gastrointestinal: Negative for abdominal pain, constipation, diarrhea, nausea and vomiting. Endocrine: Negative. Genitourinary: Negative for dysuria, frequency and urgency. Musculoskeletal: Positive for arthralgias and myalgias. Negative for back pain. Skin: Negative for rash. Allergic/Immunologic: Negative. Neurological: Negative for dizziness, tremors, weakness and headaches. Hematological: Negative. Psychiatric/Behavioral: Negative for dysphoric mood and sleep disturbance. The patient is not nervous/anxious.             OBJECTIVE     /63   Pulse 116   Temp 97 °F (36.1 °C) (Oral)   Resp 24   Ht 5' 10.47\" (1.79 m)   Wt (!) 436 lb 12.8 oz (198.1 kg) SpO2 95%   BMI 61.84 kg/m²     Wt Readings from Last 3 Encounters:   07/21/20 (!) 436 lb 12.8 oz (198.1 kg)   06/05/14 (!) 456 lb 12.8 oz (207.2 kg)   05/09/14 (!) 447 lb (202.8 kg)     Body mass index is 61.84 kg/m². Physical Exam  Constitutional:       General: He is not in acute distress. Appearance: He is well-developed. He is obese. He is not diaphoretic. HENT:      Right Ear: External ear normal.      Left Ear: External ear normal.      Nose: Nose normal.   Eyes:      General:         Right eye: No discharge. Left eye: No discharge. Conjunctiva/sclera: Conjunctivae normal.      Pupils: Pupils are equal, round, and reactive to light. Neck:      Musculoskeletal: Normal range of motion. Vascular: No JVD. Cardiovascular:      Rate and Rhythm: Normal rate and regular rhythm. Heart sounds: Normal heart sounds. No murmur. Pulmonary:      Effort: Pulmonary effort is normal. No respiratory distress. Breath sounds: Normal breath sounds. Musculoskeletal: Normal range of motion. General: Tenderness present. No deformity. Skin:     General: Skin is warm and dry. Capillary Refill: Capillary refill takes less than 2 seconds. Coloration: Skin is pale. Findings: No erythema or rash. Neurological:      Mental Status: He is alert and oriented to person, place, and time. Coordination: Coordination normal.   Psychiatric:         Behavior: Behavior normal.         Thought Content: Thought content normal.         Judgment: Judgment normal.         Lab Results   Component Value Date    LABA1C 12.9 07/21/2020       Lab Results   Component Value Date    CHOL 210 (H) 02/24/2020    TRIG 181 02/24/2020    HDL 32 02/24/2020    LDLCALC 142 02/24/2020       The ASCVD Risk score (Robyn Velázquez., et al., 2013) failed to calculate for the following reasons:     The 2013 ASCVD risk score is only valid for ages 36 to 78    Lab Results   Component Value Date     (L) 02/24/2020    K 4.5 02/24/2020    CL 94 (L) 02/24/2020    CO2 24 02/24/2020    BUN 7 02/24/2020    CREATININE 0.9 02/24/2020    GLUCOSE 447 (H) 02/24/2020    CALCIUM 8.9 02/24/2020    PROT 7.5 02/24/2020    LABALBU 3.6 02/24/2020    BILITOT 0.2 (L) 02/24/2020    ALKPHOS 105 02/24/2020    AST 64 (H) 02/24/2020    ALT 61 02/24/2020    LABGLOM >90 02/24/2020       No results found for: LABMICR, [id number]    Lab Results   Component Value Date    TSH 4.160 02/24/2020       Lab Results   Component Value Date    WBC 12.1 (H) 02/24/2020    HGB 14.0 02/24/2020    HCT 43.9 02/24/2020    MCV 93.4 02/24/2020     02/24/2020       No results found for: PSA, PSADIA      There is no immunization history on file for this patient. Health Maintenance   Topic Date Due    Pneumococcal 0-64 years Vaccine (1 of 1 - PPSV23) 08/21/1986    Diabetic foot exam  08/21/1990    A1C test (Diabetic or Prediabetic)  08/21/1990    Diabetic retinal exam  08/21/1990    HIV screen  08/21/1995    Diabetic microalbuminuria test  08/21/1998    Hepatitis B vaccine (1 of 3 - Risk 3-dose series) 08/21/1999    DTaP/Tdap/Td vaccine (1 - Tdap) 08/21/1999    Flu vaccine (1) 09/01/2020    Lipid screen  02/24/2021    TSH testing  02/24/2021    Hepatitis A vaccine  Aged Out    Hib vaccine  Aged Out    Meningococcal (ACWY) vaccine  Aged Out    Varicella vaccine  Discontinued       Future Appointments   Date Time Provider Rachel Champagne   7/29/2020  2:00 PM Preston Foster,  [facility]      Patient has significant tautness in his left shoulder muscles up into the left lower portion of his neck. He has some limitation in range of motion of his left shoulder now due to the pain, and his hand grasp is somewhat weaker than before. Pulses in the left arm are normal.      A1c today is 12.9 which is up from 9.8 on his last visit. He does have a diabetic clinic visit in 3 days.   When asked about his ongoing nausea he admits he smokes marijuana 3 or 4 times a day and I did caution him about creating cyclical nausea and vomiting because of this. Diagnosis Orders   1. Chronic left shoulder pain  cyclobenzaprine (FLEXERIL) 10 MG tablet    MRI SHOULDER LEFT W WO CONTRAST    External Referral To Orthopedic Surgery   2. Nausea  promethazine (PHENERGAN) 25 MG tablet   3. Type 2 diabetes mellitus with other specified complication, without long-term current use of insulin ([facility])  POCT glycosylated hemoglobin (Hb A1C)       PLAN      1. Patient given external referral for orthopedics. 2.  Prescription for Phenergan and Flexeril. 3.  Order for MRI placed.     Electronically signed by KELLY Kelley CNP on 7/21/2020 at 3:36 PM

## 2020-07-31 ENCOUNTER — TELEPHONE (OUTPATIENT)
Dept: FAMILY MEDICINE CLINIC | Age: 40
End: 2020-07-31

## 2020-07-31 RX ORDER — PREGABALIN 150 MG/1
150 CAPSULE ORAL 2 TIMES DAILY
Qty: 60 CAPSULE | Refills: 3 | Status: SHIPPED | OUTPATIENT
Start: 2020-07-31 | End: 2021-02-05

## 2020-07-31 NOTE — TELEPHONE ENCOUNTER
For starters, I need the patient to make an appointment and come in next week so we can do a Millennium test and see how well he is metabolizing the Norco.  I put an order in for Lyrica which I see he is been on before. He also needs to get his MRI completed. I put referrals in to pain management and orthopedics, if 1 of those could perhaps do a joint injection while we are getting the MRI completed to see what is wrong.

## 2020-08-07 RX ORDER — DIVALPROEX SODIUM 250 MG/1
500 TABLET, DELAYED RELEASE ORAL 2 TIMES DAILY
Qty: 120 TABLET | Refills: 5 | Status: SHIPPED | OUTPATIENT
Start: 2020-08-07 | End: 2021-02-05 | Stop reason: SDUPTHER

## 2020-08-07 RX ORDER — HYDROCODONE BITARTRATE AND ACETAMINOPHEN 7.5; 325 MG/1; MG/1
1 TABLET ORAL EVERY 8 HOURS PRN
Qty: 90 TABLET | Refills: 0 | Status: SHIPPED | OUTPATIENT
Start: 2020-08-07 | End: 2020-09-06

## 2020-08-19 ENCOUNTER — TELEPHONE (OUTPATIENT)
Dept: FAMILY MEDICINE CLINIC | Age: 40
End: 2020-08-19

## 2020-08-19 NOTE — TELEPHONE ENCOUNTER
[facility] central scheduling to schedule MRI left shoulder and order states W and W/O. Dx with chronic left shoulder pain. Normally it is just without contrast. Ok to change? Called to clarify where pt wants done. He lives in [place], but order states to schedule at [facility].  Pt's voicemail is full and wife's is not set up yet. Will try again tomorrow. Excision Depth: adipose tissue

## 2020-08-20 NOTE — TELEPHONE ENCOUNTER
Order changed to without contrast and faxed to [facility] to call pt to schedule. No authorization was needed.

## 2020-08-24 ENCOUNTER — OFFICE VISIT (OUTPATIENT)
Dept: PHYSICAL MEDICINE AND REHAB | Age: 40
End: 2020-08-24
Payer: COMMERCIAL

## 2020-08-24 VITALS
WEIGHT: 315 LBS | DIASTOLIC BLOOD PRESSURE: 72 MMHG | BODY MASS INDEX: 45.1 KG/M2 | TEMPERATURE: 97.9 F | HEIGHT: 70 IN | SYSTOLIC BLOOD PRESSURE: 126 MMHG

## 2020-08-24 PROCEDURE — 99244 OFF/OP CNSLTJ NEW/EST MOD 40: CPT | Performed by: NURSE PRACTITIONER

## 2020-08-24 ASSESSMENT — ENCOUNTER SYMPTOMS
COUGH: 1
EYE ITCHING: 0
DIARRHEA: 0
CONSTIPATION: 1
NAUSEA: 0
RHINORRHEA: 0
COLOR CHANGE: 0
ABDOMINAL PAIN: 1
SHORTNESS OF BREATH: 0
BACK PAIN: 1
SINUS PRESSURE: 0
SINUS PAIN: 0
EYE REDNESS: 0
CHEST TIGHTNESS: 0
VOMITING: 0
APNEA: 1
EYE PAIN: 0
SORE THROAT: 0

## 2020-08-24 NOTE — PROGRESS NOTES
[facility]  [address] [place] [address]  Dept: [phone]  Dept Fax: [phone]  Loc: [phone]    Visit Date: 8/24/2020    Rocky Trujillo is a 36 y.o. male who is referred for pain management evaluation and treatment per Dr. Mary Desouza. CAGE and CAGE-AID Questions   1. In the last three months, have you felt you should cut down or stop drinking or using drugs? Yes []        No [x]     2. In the last three months, has anyone annoyed you or gotten on your nerves by telling you to cut down or stop drinking or using drugs? Yes []        No [x]     3. In the last three months, have you felt guilty or bad about how much you drink or use drugs? Yes []        No [x]     4. In the last three months, have you been waking up wanting to have an alcoholic drink or use drugs? Yes []        No [x]        Opioid Risk Tool:  Clinician Form       1. Family History of Substance Abuse: Female Male    Alcohol   []1   []3    Illegal drugs   []2   []3    Prescription drugs     []4   []4   2. Personal History of Substance Abuse:          Alcohol   []3   []3    Illegal drugs   []4   []4    Prescription drugs     []5   []5   3. Age (sagar box if between 12 and 39):     []1   []1   4. History of Preadolescent Sexual Abuse:     []3   []0   5. Psychological Disease:      Attention deficit disorder, obsessive-compulsive disorder, bipolar, schizophrenia   []2   []2      Depression     []1   [x]1    Scoring Totals       Total Score  Low Risk  Moderate Risk  High Risk   Risk Category   0 - 3   4 - 7   8 or Above      Patient states symptoms interfere with:  A.  General Activity:  yes   B. Mood: yes    C. Walking Ability:   yes   D. Normal Work (Includes both work outside the home and housework):   yes    E.  Relations with Other People:  yes   F. Sleep:   yes   G.  Enjoyment of Life:  yes       HPI: ChiefComplaint: left shoulder and Low back pain    HPI    Patient here today for new patient referral for chronic left shoulder pain. Upon intake today, patient with multiple areas of pain noted. Patient referred from PCP. Patient on chronic norco for lumbar pain which he has been on for many years. Xr reviewed and previous MRI reviewed. Minimal findings noted. Notes reviewed also, heavy ETOH consumption noted in chart in past.     Patient was seen in 2014 at [facility] to which mild degenerative changes were noted and obesity was discussed as major factor for his pain. Weight at that time was 455lbs. Patient states was at 500lbs at the beginning of the summer and is down about 70lbs. Patient states he lost this without trying so far. Patient states that he smokes marijuana, and even with this he has not had as much cravings for food. States things that used to taste good, no longer do. Patient was also referred to [facility] for left shoulder pain, patient states he doesn't want to go out there due to poor experience. Patient also explains that he can't be seen due to overdue bills. Patient with previous rheumatology work up at [facility], no definitve diagnosis at that time. MRI left shoulder this Wednesday [address] [facility]. Patient presents for evaluation of left shoudler pain. Symptoms have been present for 7 months. Patient reports pushing himself up out of a chair and having a popping feeling with increased pain. patient states that this has been worsening also. . Patient describes symptoms as pain in left shoulder (burning, shooting and tender in character; 9/10 in severity). Pain at best is 5 out of 10. Pain also in the neck at times. Symptoms are worst: with activity. Alleviating factors identifiable by patient are none. Exacerbating factors identifiable by patient are moving shoulder. . Treatments so far initiated by patient include topical menthol, lidocaine patch, ice, heat, tylenol and prescription pain      Spine Lumbar 2 or 3 Views   8/28/2013 11:05:42 AM              CPT4 code         72796                   Reason For Exam         pain;pain                   REPORT         LUMBAR SPINE (2V):              CLINICAL INFORMATION:  Low back and left leg pain.              COMPARISON:  There are no previous studies for comparison.              FINDINGS: Kristal Estrada is a slight lumbar levoscoliosis.  There appears to be a         transitional L5-S1 segment.  There is mild narrowing at the L5-S1 level.         There is no fracture or subluxation.  Incidental note is made of a moderate         to large amount of retained stool along the visualized colon.              IMPRESSION:              MILD DEGENERATIVE CHANGES AND A SLIGHT LEVOSCOLIOSIS. EXAM: MRI Lumbar Spine W/O Contrast=L, 09/03/2014 08:56 AM    COMPARISON: MR lumbar spine August 28, 2013. CLINICAL INDICATIONS:   Back and leg pain. 724.5 - Backache,   unspecified. Age: 29 years  Gender: Male    TECHNIQUE: A series of sagittal and axial multisequence images of the   lumbar spine were obtained without intravenous contrast using   standard protocol. FINDINGS:  Image quality is degraded by a combination of patient motion and body   habitus. The lowest rib-bearing vertebra is assumed to be T12. There are 4   lumbar-type vertebrae. Alignment is normal.      Vertebral bodies are within normal limits in height. Mild   degenerative marrow signal changes are seen adjacent to several   endplates. Scattered small rounded foci of T1 shortening within the   vertebral bodies are suggestive of incidental intraosseous   hemangiomas. No suspicious focal osseous lesion is identified. Disc dessication and loss of disc height are present at multiple   levels, most pronounced at L4-S1. Nonspecific edema is seen within the superficial soft tissues dorsal   to the thoracolumbar fascia.      Conus and cauda equina are within normal limits in signal and   position. By levels:    L1-L2: No disc herniation. Mildly prominent dorsal epidural fat. No   canal or foraminal stenosis. L2-L3: No disc herniation. Mildly prominent dorsal epidural fat. No   canal or foraminal stenosis. L3-L4: No disc herniation. Mild bilateral facet arthropathy. Mildly   prominent dorsal epidural fat. No canal stenosis. Mild left neural   foraminal narrowing. L4-S1: Small central disc protrusion. Moderate bilateral facet   arthropathy. Mildly prominent dorsal and ventral epidural fat. No   canal stenosis. Mild bilateral neural foraminal narrowing. Degenerative findings are not appreciably changed from October 28, 2013. IMPRESSION  IMPRESSION:  Note the presence of only 4 lumbar-type vertebrae. Multilevel degenerative changes, without canal stenosis. Mild left L3   and bilateral L4 neural foraminal narrowing. The patient is allergic to gabapentin; nsaids; aleve [naproxen sodium]; ibuprofen; and pcn [penicillins]. Randee Mora  has a past medical history of DDD (degenerative disc disease), lumbar, DM2 (diabetes mellitus, type 2) ([address] Fibromyalgia, GERD (gastroesophageal reflux disease), History of kidney stones, Hypertension, essential, Hypothyroidism, Migraine headache, Morbid obesity ([address] Nicotine dependence, Osteoarthritis, and PTSD (post-traumatic stress disorder). Past Surgical History  The patient  has a past surgical history that includes Colonoscopy; Endoscopy, colon, diagnostic; Lithotripsy (2010); and fracture surgery. Family History  This patient's family history includes COPD in his father; Heart Disease in his paternal grandfather; High Blood Pressure in his maternal grandmother and mother. Social History  Chad Councilman  reports that he has been smoking cigarettes. He has been smoking about 0.25 packs per day. He has quit using smokeless tobacco.  His smokeless tobacco use included chew.  He reports current alcohol use. He reports current drug use. Drug: Marijuana. Medications    Current Outpatient Medications:     HYDROcodone-acetaminophen (NORCO) 7.5-325 MG per tablet, Take 1 tablet by mouth every 8 hours as needed for Pain for up to 30 days. Intended supply: 30 days, Disp: 90 tablet, Rfl: 0    divalproex (DEPAKOTE) 250 MG DR tablet, Take 2 tablets by mouth 2 times daily, Disp: 120 tablet, Rfl: 5    pregabalin (LYRICA) 150 MG capsule, Take 1 capsule by mouth 2 times daily for 30 days. , Disp: 60 capsule, Rfl: 3    promethazine (PHENERGAN) 25 MG tablet, Take 1 tablet by mouth every 6 hours as needed for Nausea, Disp: 20 tablet, Rfl: 0    DULoxetine (CYMBALTA) 60 MG extended release capsule, Take 1 capsule by mouth daily, Disp: 30 capsule, Rfl: 3    blood glucose monitor strips, Test once a day & as needed for symptoms of irregular blood glucose., Disp: 200 strip, Rfl: 0    ARIPiprazole (ABILIFY) 10 MG tablet, Take 1 tablet by mouth daily, Disp: 30 tablet, Rfl: 3    LEVEMIR FLEXTOUCH 100 UNIT/ML injection pen, Inject 35 Units into the skin 2 times daily, Disp: 5 pen, Rfl: 2    rOPINIRole (REQUIP) 3 MG tablet, Take 1 tablet by mouth nightly, Disp: 30 tablet, Rfl: 3    levothyroxine (SYNTHROID) 50 MCG tablet, Take 1 tablet by mouth daily, Disp: 30 tablet, Rfl: 5    metFORMIN (GLUCOPHAGE) 500 MG tablet, Take 1 tablet by mouth 2 times daily (Patient taking differently: Take 500 mg by mouth 4 times daily ), Disp: 180 tablet, Rfl: 1    Dulaglutide (TRULICITY) 1.5 UJ/7.2TM SOPN, Inject 1.5 mg into the skin once a week, Disp: 4 pen, Rfl: 2    hydrALAZINE (APRESOLINE) 100 MG tablet, Take 1 tablet by mouth 3 times daily, Disp: , Rfl:     Subjective:      Review of Systems   Constitutional: Positive for activity change, diaphoresis and fatigue. Negative for chills and fever. HENT: Positive for congestion.  Negative for ear discharge, ear pain, mouth sores, nosebleeds, postnasal drip, rhinorrhea, sinus pressure, sinus pain and sore throat. Eyes: Negative for pain, redness and itching. Respiratory: Positive for apnea (non compliant with cpap) and cough. Negative for chest tightness and shortness of breath. Cardiovascular: Positive for palpitations. Negative for chest pain and leg swelling. Gastrointestinal: Positive for abdominal pain and constipation. Negative for diarrhea, nausea and vomiting. Endocrine: Negative for cold intolerance and heat intolerance. Genitourinary: Positive for frequency. Negative for difficulty urinating and urgency. Musculoskeletal: Positive for arthralgias, back pain and myalgias. Negative for gait problem, neck pain and neck stiffness. Skin: Negative for color change, rash and wound. Allergic/Immunologic: Positive for food allergies. Negative for environmental allergies. Neurological: Positive for dizziness, light-headedness, numbness and headaches. Negative for seizures. Hematological: Does not bruise/bleed easily. Psychiatric/Behavioral: Positive for sleep disturbance. The patient is nervous/anxious. Objective:     Vitals:    08/24/20 1346   BP: 126/72   Temp: 97.9 °F (36.6 °C)   Weight: (!) 436 lb (197.8 kg)   Height: 5' 10.47\" (1.79 m)       Physical Exam  Vitals signs reviewed. Constitutional:       Appearance: He is well-developed. He is obese. He is not diaphoretic. HENT:      Head: Normocephalic and atraumatic. Not macrocephalic and not microcephalic. Right Ear: External ear normal.      Left Ear: External ear normal.   Eyes:      General:         Right eye: No discharge. Left eye: No discharge. Conjunctiva/sclera: Conjunctivae normal.   Neck:      Musculoskeletal: Decreased range of motion. Trachea: No tracheal deviation. Cardiovascular:      Rate and Rhythm: Normal rate and regular rhythm. Heart sounds: Normal heart sounds. No murmur. No gallop.     Pulmonary:      Effort: Pulmonary effort is normal. No respiratory possible weaning of medication dosing dependent on treatment/procedure results.  Discussed with patient about safe storage of medications at home   Testing: cervical xray. await MRI left shoulder - consideration for left shoudler inejction   Procedures: await imaging   Discussed with patient about risks with procedure including infection, reaction to medication, increased pain, or bleeding.  Medications: diclofenac gel OTC up to 4 times    Request previous EMG from Dr Abdon Dallas notes from [facility] - Dr Ricco Chery consult if patient would like.  Consider ortho referral to [address] [place] [address] for left shoulder, PT for back pain - TENS, US, dry needling, massage and other treatment modalities. Previous Treatments tried:  · PT: No  · NSAIDs: No,  Causes palpitations  · Chiropractic: Yes,  any benefit? No  · Muscle relaxants: Yes,  any benefit? No, mood swings  · Narcotics: Yes,  any benefit? Yes   · Spine surgeon consult: Yes  · Any Implants: No    Meds. Prescribed:   No orders of the defined types were placed in this encounter. Return in about 2 weeks (around 9/7/2020) for follow up after MRI. Time spent with patient was 60 minutes more than 50% was spent  Counseling/coordinated the patient'scare.     Electronically signed by KELLY Santana CNP on 8/24/2020 at 3:12 PM

## 2020-08-24 NOTE — PATIENT INSTRUCTIONS
Testing: cervical xray. await MRI left shoulder - consideration for left shoudler inejction   Procedures: await imaging   Medications: diclofenac gel OTC up to 4 times    Request previous EMG from Dr Madeline Chirinos notes from [facility] - Dr Abbie Moore consult if patient would like.  Consider ortho referral to [address] [place] [address]  OT for left shoulder, PT for back pain - TENS, US, dry needling, massage and other treatment modalities.

## 2020-08-25 RX ORDER — ARIPIPRAZOLE 10 MG/1
10 TABLET ORAL DAILY
Qty: 30 TABLET | Refills: 3 | Status: SHIPPED | OUTPATIENT
Start: 2020-08-25

## 2020-08-26 ENCOUNTER — HOSPITAL ENCOUNTER (OUTPATIENT)
Dept: MRI IMAGING | Age: 40
Discharge: HOME | End: 2020-08-26
Payer: COMMERCIAL

## 2020-09-03 ENCOUNTER — TELEPHONE (OUTPATIENT)
Dept: PHYSICAL MEDICINE AND REHAB | Age: 40
End: 2020-09-03

## 2020-09-03 NOTE — TELEPHONE ENCOUNTER
Patient LM to speak to someone regarding UDS. Patient stated on voicemail he was \"freaking out\" and needed to speak with someone. Attempted to call. No answer and VM not set up.     UDS results from 08/24/2020:    +norco - consistent from PCP     +amphetamine, methamphetamine, and d-methamphetamine at 100%     Just an [place]

## 2020-10-01 ENCOUNTER — TELEPHONE (OUTPATIENT)
Dept: FAMILY MEDICINE CLINIC | Age: 40
End: 2020-10-01

## 2020-10-02 NOTE — TELEPHONE ENCOUNTER
Received a faxed refill request from [address] [place], [place] [address] for metformin 500mg bid. Med list shows taking 4 times daily. [facility] was informed he no showed in July for Diabetes clinic appt. (referral placed at 5/18/20 appt)  [facility] wants him to take 4 metformin daily and is requesting a follow up appt around 10/21.  Rx pending for 360/0rf with note to pharmacy

## 2020-11-18 RX ORDER — ROPINIROLE 3 MG/1
3 TABLET, FILM COATED ORAL NIGHTLY
Qty: 30 TABLET | Refills: 3 | Status: SHIPPED | OUTPATIENT
Start: 2020-11-18 | End: 2021-01-12 | Stop reason: ALTCHOICE

## 2020-11-18 RX ORDER — LEVOTHYROXINE SODIUM 0.05 MG/1
50 TABLET ORAL DAILY
Qty: 30 TABLET | Refills: 5 | Status: SHIPPED | OUTPATIENT
Start: 2020-11-18 | End: 2021-01-12 | Stop reason: ALTCHOICE

## 2020-11-18 RX ORDER — DULOXETIN HYDROCHLORIDE 60 MG/1
60 CAPSULE, DELAYED RELEASE ORAL DAILY
Qty: 30 CAPSULE | Refills: 3 | Status: SHIPPED | OUTPATIENT
Start: 2020-11-18 | End: 2021-01-12 | Stop reason: ALTCHOICE

## 2021-01-12 NOTE — PROGRESS NOTES
Patient chart reviewed today. Patient continues to be noncompliant with his medical therapy and will not come to the office for follow-up. Medication refills into the future are discontinued and the patient will be discharged from the practice.

## 2021-02-05 ENCOUNTER — VIRTUAL VISIT (OUTPATIENT)
Dept: FAMILY MEDICINE CLINIC | Age: 41
End: 2021-02-05
Payer: COMMERCIAL

## 2021-02-05 DIAGNOSIS — M54.9 CHRONIC BILATERAL BACK PAIN, UNSPECIFIED BACK LOCATION: Primary | ICD-10-CM

## 2021-02-05 DIAGNOSIS — F43.10 PTSD (POST-TRAUMATIC STRESS DISORDER): ICD-10-CM

## 2021-02-05 DIAGNOSIS — G43.909 MIGRAINE WITHOUT STATUS MIGRAINOSUS, NOT INTRACTABLE, UNSPECIFIED MIGRAINE TYPE: ICD-10-CM

## 2021-02-05 DIAGNOSIS — G89.29 CHRONIC BILATERAL BACK PAIN, UNSPECIFIED BACK LOCATION: Primary | ICD-10-CM

## 2021-02-05 DIAGNOSIS — E11.69 TYPE 2 DIABETES MELLITUS WITH OTHER SPECIFIED COMPLICATION, WITHOUT LONG-TERM CURRENT USE OF INSULIN (HCC): ICD-10-CM

## 2021-02-05 PROCEDURE — 99203 OFFICE O/P NEW LOW 30 MIN: CPT | Performed by: FAMILY MEDICINE

## 2021-02-05 RX ORDER — DIAZEPAM 10 MG/1
10 TABLET ORAL EVERY 6 HOURS PRN
Qty: 60 TABLET | Refills: 0 | Status: SHIPPED | OUTPATIENT
Start: 2021-02-05 | End: 2021-03-07

## 2021-02-05 RX ORDER — INSULIN GLARGINE 100 [IU]/ML
20 INJECTION, SOLUTION SUBCUTANEOUS NIGHTLY
Qty: 5 PEN | Refills: 3 | Status: SHIPPED | OUTPATIENT
Start: 2021-02-05

## 2021-02-05 RX ORDER — DIVALPROEX SODIUM 250 MG/1
500 TABLET, DELAYED RELEASE ORAL 2 TIMES DAILY
Qty: 120 TABLET | Refills: 5 | Status: SHIPPED | OUTPATIENT
Start: 2021-02-05 | End: 2021-03-07

## 2021-02-05 RX ORDER — DULAGLUTIDE 1.5 MG/.5ML
1.5 INJECTION, SOLUTION SUBCUTANEOUS WEEKLY
Qty: 4 PEN | Refills: 2 | Status: SHIPPED | OUTPATIENT
Start: 2021-02-05

## 2021-02-05 NOTE — PROGRESS NOTES
Chris Weinstein. (:  1980) is a 36 y.o. [facility] patient, here for evaluation of the following chief complaint(s): depression, dm2    ASSESSMENT/PLAN:  1. Chronic bilateral back pain, unspecified back location- refer to Dr. Jluis Walker, Dillon Barber, DO, Pain Medicine, [facility]  2. Type 2 diabetes mellitus with other specified complication, without long-term current use of insulin ([address]- uncontrolled. Restart trulicity 9.3KN sc wkly and start lantus 20 units nightly. Restart metformin 1000mg bid. F/u in clinic for a1c in 2 wks. -     Dulaglutide (TRULICITY) 1.5 XC/9.3EN SOPN; Inject 1.5 mg into the skin once a week, Disp-4 pen, R-2Normal  -     metFORMIN (GLUCOPHAGE) 500 MG tablet; Take 2 tablets by mouth 2 times daily, Disp-180 tablet, R-3Pt needs to call office for a follow up apptNormal  3. Migraine without status migrainosus, not intractable, unspecified migraine type- cont depakote. Consider topamax in the future. -     divalproex (DEPAKOTE) 250 MG DR tablet; Take 2 tablets by mouth 2 times daily, Disp-120 tablet, R-5Normal  4. PTSD (post-traumatic stress disorder)- start valium 10mg bid prn and encouragd to get in with [facility] in the near future. -     diazePAM (VALIUM) 10 MG tablet; Take 1 tablet by mouth every 6 hours as needed for Anxiety for up to 30 days. , Disp-60 tablet, R-0Normal      No follow-ups on file. SUBJECTIVE/OBJECTIVE:  HPI  Establish care. Dm2:  Average 350-400. Last 4 months high. Has been out of trulicity and levemir. Migraine:  Controlled with depakote. 6 per month. Neuropathy: not controlled with lyrica. Tried nsaids, muscle relaxers, gabapentin, injections in the low back x 3 no improvements. No surgeries. Went to pain management in [place] [id number]. Smokes marijuana regularly. This seems to help. Severe depression and ptsd: abilify has not worked well. Valium seemed to work prn. Helped with severe aggression and anger that valium helped. 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 Tried zoloft and prozac caused SI. Going to go to [employer].                Patient-Reported Vitals 2/5/2021   Patient-Reported Weight 400   Patient-Reported Height 6' 1\"        Physical Exam    [INSTRUCTIONS:  \"[x]\" Indicates a positive item  \"[]\" Indicates a negative item  -- DELETE ALL ITEMS NOT EXAMINED]    Constitutional: [x] Appears well-developed and well-nourished [x] No apparent distress      [] Abnormal -     Mental status: [x] Alert and awake  [x] Oriented to person/place/time [x] Able to follow commands    [] Abnormal -     Eyes:   EOM    [x]  Normal    [] Abnormal -   Sclera  [x]  Normal    [] Abnormal -          Discharge [x]  None visible   [] Abnormal -     HENT: [x] Normocephalic, atraumatic  [] Abnormal -   [x] Mouth/Throat: Mucous membranes are moist    External Ears [x] Normal  [] Abnormal -    Neck: [x] No visualized mass [] Abnormal -     Pulmonary/Chest: [x] Respiratory effort normal   [x] No visualized signs of difficulty breathing or respiratory distress        [] Abnormal -      Musculoskeletal:   [x] Normal gait with no signs of ataxia         [x] Normal range of motion of neck        [] Abnormal -     Neurological:        [x] No Facial Asymmetry (Cranial nerve 7 motor function) (limited exam due to video visit)          [x] No gaze palsy        [] Abnormal -          Skin:        [x] No significant exanthematous lesions or discoloration noted on facial skin         [] Abnormal -            Psychiatric:       [x] Normal Affect [] Abnormal -        [x] No Hallucinations    Other pertinent observable physical exam findings:- Ivelisse Alarcon is a 36 y.o. male being evaluated by a Virtual Visit (video visit) encounter to address concerns as mentioned above. A caregiver was present when appropriate. Due to this being a TeleHealth encounter (During RAGYB-07 public health emergency), evaluation of the following organ systems was limited: Vitals/Constitutional/EENT/Resp/CV/GI//MS/Neuro/Skin/Heme-Lymph-Imm. Pursuant to the emergency declaration under the [address] [place] [address] and the [employer] Act, this Virtual Visit was conducted with patient's (and/or legal guardian's) consent, to reduce the patient's risk of exposure to COVID-19 and provide necessary medical care. The patient (and/or legal guardian) has also been advised to contact this office for worsening conditions or problems, and seek emergency medical treatment and/or call 911 if deemed necessary. Patient identification was verified at the start of the visit: Yes    Services were provided through a video synchronous discussion virtually to substitute for in-person clinic visit. Patient was located at home and provider was located in office or at home. An electronic signature was used to authenticate this note.     --Mio Pedro DO

## 2021-02-09 ENCOUNTER — TELEPHONE (OUTPATIENT)
Dept: FAMILY MEDICINE CLINIC | Age: 41
End: 2021-02-09

## 2021-02-18 ENCOUNTER — TELEPHONE (OUTPATIENT)
Dept: FAMILY MEDICINE CLINIC | Age: 41
End: 2021-02-18

## 2021-02-18 RX ORDER — INSULIN GLARGINE 100 [IU]/ML
20 INJECTION, SOLUTION SUBCUTANEOUS NIGHTLY
Qty: 10 PEN | Refills: 3 | Status: SHIPPED | OUTPATIENT
Start: 2021-02-18

## 2021-02-18 NOTE — TELEPHONE ENCOUNTER
Tramadol is not an NSAID, like you said. If he feels the medication is making him feel weird and the pain in unbearable, he should go to the pain. Otherwise, we will wait for his appointment with pain management at this time.

## 2021-02-18 NOTE — TELEPHONE ENCOUNTER
Pt's wife [facility] (on HIPAA) left a VM saying that pt had a VV yesterday and was prescribed Tramadol. He took a dose last night and this morning and feels off. Today he got lightheaded and almost passed out. She said she looked it up and Tramadol is an NSAID and pt is allergic to NSAIDS. I called the pt and said he got dizzy about 30 minutes after taking it last night. He mentioned that Tramadol is a NSAID and I explained to him this med is not an NSAID. About 30 minutes after taking a dose this morning he got very dizzy. He denies CP, SOB, vision changes, and HA. He states currently his back is very painful, but isn't dizzy. He gets very dizzy and his knees get weak when he gets up. Please advise.

## 2021-02-23 ENCOUNTER — TELEPHONE (OUTPATIENT)
Dept: FAMILY MEDICINE CLINIC | Age: 41
End: 2021-02-23

## 2021-03-04 ENCOUNTER — TELEPHONE (OUTPATIENT)
Dept: PHYSICAL MEDICINE AND REHAB | Age: 41
End: 2021-03-04

## 2021-04-08 ENCOUNTER — TELEPHONE (OUTPATIENT)
Dept: FAMILY MEDICINE CLINIC | Age: 41
End: 2021-04-08

## 2021-04-08 NOTE — TELEPHONE ENCOUNTER
Lupis Beckman called and states that he would like me to call his wife Jordon Diop on HIPAA at [phone] to discuss what is going on with him due to he states that she would be better to explain it. I called his wife and there was no answer and no voicemail set up at this time. Lupis Beckman also states that he left multiple voicemails yesterday for call back. I told him I am not sure where he left the voicemail's due to we have not had voicemail in our office for 2 weeks now and I was on the phones all day yesterday. Lupis Beckman didn't say anything else about the messages. I tried calling the [phone] number in Lupis Beckman chart and it states that it is no longer in service.

## 2021-04-09 NOTE — TELEPHONE ENCOUNTER
Thank you for the message. I'm happy to help if he needs me, but not sure how to help at this point. Thanks!

## 2021-04-09 NOTE — TELEPHONE ENCOUNTER
Mich Begum. returned call and states that his wife spoke with someone and they informed her to have Mich Begum. go to Emergency room. I am not sure who she spoke with due to there is no messages in Mich Begum. chart. Mich Begum. then states that he is firing Dr. Mejia Sher and going to [facility] Dr. Mejia Sher and [facility] due to they are not wanting to help him with his care.